# Patient Record
Sex: MALE | Race: WHITE | NOT HISPANIC OR LATINO | URBAN - METROPOLITAN AREA
[De-identification: names, ages, dates, MRNs, and addresses within clinical notes are randomized per-mention and may not be internally consistent; named-entity substitution may affect disease eponyms.]

---

## 2020-04-03 ENCOUNTER — IMPORTED ENCOUNTER (OUTPATIENT)
Dept: URBAN - METROPOLITAN AREA CLINIC 1 | Facility: CLINIC | Age: 50
End: 2020-04-03

## 2020-04-03 PROBLEM — H52.4: Noted: 2020-04-03

## 2020-04-03 PROBLEM — H52.01: Noted: 2020-04-03

## 2020-04-03 PROCEDURE — S0620 ROUTINE OPHTHALMOLOGICAL EXA: HCPCS

## 2020-04-03 NOTE — PATIENT DISCUSSION
1.  Hyperopia w/ Presbyopia -- Rx was given for corrective spectacles if indicated. CTL trial OD dispensed. Pt may have to use reading glasses over CTL. 2.  Amblyopia OD3. Cataract OU -- Observe. 4. H/o LASIK OD [facility] ~ 2010)Return in 2 week cc with Dr. Elma Senior. Return for an appointment in 2-3 months LASIK consult with Dr. Pauline Mcnulty.

## 2020-04-17 ENCOUNTER — IMPORTED ENCOUNTER (OUTPATIENT)
Dept: URBAN - METROPOLITAN AREA CLINIC 1 | Facility: CLINIC | Age: 50
End: 2020-04-17

## 2020-04-17 NOTE — PATIENT DISCUSSION
CC today -- Patient unhappy with distance vision OD. NI with over refraction. Explained that the contact lens OD is to help balance the vision in both eyes. Explained that glasses may not be the best option d/t the large difference in rx between both eyes. Explained that his astigmatism is too small to be corrected in contact lenses. Discussed with patient best corrected vision will be achieved with glasses. Patient understands may need to wear OTC readers to read fine print. Finalized CTLrx. Return for an appointment in 2-3 month LASIK Consult with Dr. Heather Ndiaye.

## 2020-08-28 ENCOUNTER — IMPORTED ENCOUNTER (OUTPATIENT)
Dept: URBAN - METROPOLITAN AREA CLINIC 1 | Facility: CLINIC | Age: 50
End: 2020-08-28

## 2020-08-28 PROBLEM — H52.01: Noted: 2020-08-28

## 2020-08-28 NOTE — PATIENT DISCUSSION
1.  Hyperopia: Refractive surgery not recommended given prior Lasik hx cataracts and degree of residual hyperopia. Will check cataract status in 1 year. 2. Return for an appointment for 1 year for a 30/glare with Dr. Jazmin Clark.

## 2022-03-04 ENCOUNTER — COMPREHENSIVE EXAM (OUTPATIENT)
Dept: URBAN - METROPOLITAN AREA CLINIC 1 | Facility: CLINIC | Age: 52
End: 2022-03-04

## 2022-03-04 DIAGNOSIS — H52.4: ICD-10-CM

## 2022-03-04 DIAGNOSIS — Z01.00: ICD-10-CM

## 2022-03-04 PROCEDURE — 92310 CONTACT LENS FITTING OU: CPT

## 2022-03-04 PROCEDURE — S0621 ROUTINE OPHTHALMOLOGICAL EXA: HCPCS

## 2022-03-04 ASSESSMENT — VISUAL ACUITY
OD_SC: J16
OD_SC: 20/50+1
OS_SC: 20/20-1
OS_SC: J3

## 2022-03-04 ASSESSMENT — TONOMETRY
OS_IOP_MMHG: 16
OD_IOP_MMHG: 16

## 2022-03-18 PROBLEM — Z12.11 COLON CANCER SCREENING: Status: ACTIVE | Noted: 2020-04-06

## 2022-03-18 PROBLEM — R05.9 COUGH: Status: ACTIVE | Noted: 2020-04-06

## 2022-03-18 PROBLEM — I10 HTN (HYPERTENSION): Status: ACTIVE | Noted: 2020-04-06

## 2022-03-18 PROBLEM — R03.0 ELEVATED BLOOD-PRESSURE READING, WITHOUT DIAGNOSIS OF HYPERTENSION: Status: ACTIVE | Noted: 2020-04-06

## 2022-03-18 PROBLEM — F43.20 ADJUSTMENT REACTION: Status: ACTIVE | Noted: 2020-04-06

## 2022-03-18 PROBLEM — D22.9 ATYPICAL MOLE: Status: ACTIVE | Noted: 2020-04-06

## 2022-03-18 PROBLEM — R39.14 FEELING OF INCOMPLETE BLADDER EMPTYING: Status: ACTIVE | Noted: 2020-04-06

## 2022-03-18 PROBLEM — D23.4 OTHER BENIGN NEOPLASM OF SKIN OF SCALP AND NECK: Status: ACTIVE | Noted: 2020-04-06

## 2022-03-18 PROBLEM — S80.01XA CONTUSION OF RIGHT KNEE: Status: ACTIVE | Noted: 2020-04-06

## 2022-03-19 PROBLEM — R63.5 ABNORMAL WEIGHT GAIN: Status: ACTIVE | Noted: 2020-04-06

## 2022-03-19 PROBLEM — E34.9 TESTOSTERONE DEFICIENCY: Status: ACTIVE | Noted: 2020-04-06

## 2022-03-19 PROBLEM — M25.561 PAIN IN RIGHT KNEE: Status: ACTIVE | Noted: 2020-04-06

## 2022-03-19 PROBLEM — Z86.39 HISTORY OF MORBID OBESITY: Status: ACTIVE | Noted: 2018-03-12

## 2022-03-19 PROBLEM — M10.9 GOUT: Status: ACTIVE | Noted: 2020-04-06

## 2022-03-19 PROBLEM — R39.14 BENIGN PROSTATIC HYPERPLASIA WITH INCOMPLETE BLADDER EMPTYING: Status: ACTIVE | Noted: 2020-04-06

## 2022-03-19 PROBLEM — F18.10: Status: ACTIVE | Noted: 2020-04-06

## 2022-03-19 PROBLEM — J20.0 ACUTE BRONCHITIS DUE TO MYCOPLASMA PNEUMONIAE: Status: ACTIVE | Noted: 2020-04-06

## 2022-03-19 PROBLEM — G47.30 SLEEP APNEA: Status: ACTIVE | Noted: 2019-02-27

## 2022-03-19 PROBLEM — F43.9 STRESS AT HOME: Status: ACTIVE | Noted: 2020-04-06

## 2022-03-19 PROBLEM — N40.1 BENIGN PROSTATIC HYPERPLASIA WITH INCOMPLETE BLADDER EMPTYING: Status: ACTIVE | Noted: 2020-04-06

## 2022-03-19 PROBLEM — E66.3 OVERWEIGHT: Status: ACTIVE | Noted: 2020-04-06

## 2022-03-19 PROBLEM — E66.01 SEVERE OBESITY (BMI 35.0-39.9) WITH COMORBIDITY (HCC): Status: ACTIVE | Noted: 2018-05-11
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
## 2022-03-20 PROBLEM — C43.60: Status: ACTIVE | Noted: 2020-04-06

## 2022-03-20 PROBLEM — F90.0 ATTENTION-DEFICIT HYPERACTIVITY DISORDER, PREDOMINANTLY INATTENTIVE TYPE: Status: ACTIVE | Noted: 2020-04-06

## 2022-04-02 ASSESSMENT — TONOMETRY
OD_IOP_MMHG: 16
OS_IOP_MMHG: 16

## 2022-04-02 ASSESSMENT — VISUAL ACUITY
OD_CC: 20/40
OS_CC: 20/20-1
OD_SC: 20/20-1
OS_CC: 20/20
OD_SC: 20/20-2
OS_CC: 20/20

## 2023-11-29 ENCOUNTER — ANESTHESIA EVENT (OUTPATIENT)
Facility: HOSPITAL | Age: 53
End: 2023-11-29
Payer: COMMERCIAL

## 2023-11-30 ENCOUNTER — HOSPITAL ENCOUNTER (OUTPATIENT)
Facility: HOSPITAL | Age: 53
Setting detail: OUTPATIENT SURGERY
Discharge: HOME | End: 2023-11-30
Attending: UROLOGY | Admitting: UROLOGY
Payer: COMMERCIAL

## 2023-11-30 ENCOUNTER — ANESTHESIA (OUTPATIENT)
Facility: HOSPITAL | Age: 53
End: 2023-11-30
Payer: COMMERCIAL

## 2023-11-30 VITALS
DIASTOLIC BLOOD PRESSURE: 76 MMHG | HEIGHT: 71 IN | HEART RATE: 87 BPM | BODY MASS INDEX: 38.69 KG/M2 | RESPIRATION RATE: 16 BRPM | TEMPERATURE: 97.6 F | OXYGEN SATURATION: 95 % | WEIGHT: 276.4 LBS | SYSTOLIC BLOOD PRESSURE: 123 MMHG

## 2023-11-30 DIAGNOSIS — Z01.818 PRE-OP TESTING: Primary | ICD-10-CM

## 2023-11-30 PROCEDURE — 2500000003 HC RX 250 WO HCPCS

## 2023-11-30 PROCEDURE — 7100000010 HC PHASE II RECOVERY - FIRST 15 MIN: Performed by: UROLOGY

## 2023-11-30 PROCEDURE — 3600000012 HC SURGERY LEVEL 2 ADDTL 15MIN: Performed by: UROLOGY

## 2023-11-30 PROCEDURE — 7100000011 HC PHASE II RECOVERY - ADDTL 15 MIN: Performed by: UROLOGY

## 2023-11-30 PROCEDURE — 7100000000 HC PACU RECOVERY - FIRST 15 MIN: Performed by: UROLOGY

## 2023-11-30 PROCEDURE — 2580000003 HC RX 258: Performed by: UROLOGY

## 2023-11-30 PROCEDURE — 2720000010 HC SURG SUPPLY STERILE: Performed by: UROLOGY

## 2023-11-30 PROCEDURE — 2709999900 HC NON-CHARGEABLE SUPPLY: Performed by: UROLOGY

## 2023-11-30 PROCEDURE — 3700000001 HC ADD 15 MINUTES (ANESTHESIA): Performed by: UROLOGY

## 2023-11-30 PROCEDURE — 3700000000 HC ANESTHESIA ATTENDED CARE: Performed by: UROLOGY

## 2023-11-30 PROCEDURE — 3600000002 HC SURGERY LEVEL 2 BASE: Performed by: UROLOGY

## 2023-11-30 PROCEDURE — 6360000002 HC RX W HCPCS

## 2023-11-30 PROCEDURE — 7100000001 HC PACU RECOVERY - ADDTL 15 MIN: Performed by: UROLOGY

## 2023-11-30 PROCEDURE — 6360000002 HC RX W HCPCS: Performed by: UROLOGY

## 2023-11-30 RX ORDER — LEVOFLOXACIN 5 MG/ML
500 INJECTION, SOLUTION INTRAVENOUS
Status: COMPLETED | OUTPATIENT
Start: 2023-11-30 | End: 2023-11-30

## 2023-11-30 RX ORDER — SODIUM CHLORIDE 9 MG/ML
INJECTION, SOLUTION INTRAVENOUS PRN
Status: DISCONTINUED | OUTPATIENT
Start: 2023-11-30 | End: 2023-11-30 | Stop reason: HOSPADM

## 2023-11-30 RX ORDER — ONDANSETRON 2 MG/ML
4 INJECTION INTRAMUSCULAR; INTRAVENOUS
Status: DISCONTINUED | OUTPATIENT
Start: 2023-11-30 | End: 2023-11-30 | Stop reason: HOSPADM

## 2023-11-30 RX ORDER — DROPERIDOL 2.5 MG/ML
0.62 INJECTION, SOLUTION INTRAMUSCULAR; INTRAVENOUS
Status: DISCONTINUED | OUTPATIENT
Start: 2023-11-30 | End: 2023-11-30 | Stop reason: HOSPADM

## 2023-11-30 RX ORDER — LIDOCAINE HYDROCHLORIDE 20 MG/ML
INJECTION, SOLUTION EPIDURAL; INFILTRATION; INTRACAUDAL; PERINEURAL PRN
Status: DISCONTINUED | OUTPATIENT
Start: 2023-11-30 | End: 2023-11-30 | Stop reason: SDUPTHER

## 2023-11-30 RX ORDER — SODIUM CHLORIDE 0.9 % (FLUSH) 0.9 %
5-40 SYRINGE (ML) INJECTION EVERY 12 HOURS SCHEDULED
Status: DISCONTINUED | OUTPATIENT
Start: 2023-11-30 | End: 2023-11-30 | Stop reason: HOSPADM

## 2023-11-30 RX ORDER — IPRATROPIUM BROMIDE AND ALBUTEROL SULFATE 2.5; .5 MG/3ML; MG/3ML
1 SOLUTION RESPIRATORY (INHALATION)
Status: DISCONTINUED | OUTPATIENT
Start: 2023-11-30 | End: 2023-11-30 | Stop reason: HOSPADM

## 2023-11-30 RX ORDER — SODIUM CHLORIDE, SODIUM LACTATE, POTASSIUM CHLORIDE, CALCIUM CHLORIDE 600; 310; 30; 20 MG/100ML; MG/100ML; MG/100ML; MG/100ML
INJECTION, SOLUTION INTRAVENOUS CONTINUOUS
Status: DISCONTINUED | OUTPATIENT
Start: 2023-11-30 | End: 2023-11-30 | Stop reason: HOSPADM

## 2023-11-30 RX ORDER — FENTANYL CITRATE 50 UG/ML
INJECTION, SOLUTION INTRAMUSCULAR; INTRAVENOUS PRN
Status: DISCONTINUED | OUTPATIENT
Start: 2023-11-30 | End: 2023-11-30 | Stop reason: SDUPTHER

## 2023-11-30 RX ORDER — FUROSEMIDE 10 MG/ML
INJECTION INTRAMUSCULAR; INTRAVENOUS PRN
Status: DISCONTINUED | OUTPATIENT
Start: 2023-11-30 | End: 2023-11-30 | Stop reason: SDUPTHER

## 2023-11-30 RX ORDER — MIDAZOLAM HYDROCHLORIDE 1 MG/ML
INJECTION INTRAMUSCULAR; INTRAVENOUS PRN
Status: DISCONTINUED | OUTPATIENT
Start: 2023-11-30 | End: 2023-11-30 | Stop reason: SDUPTHER

## 2023-11-30 RX ORDER — OXYCODONE HYDROCHLORIDE 5 MG/1
5 TABLET ORAL
Status: DISCONTINUED | OUTPATIENT
Start: 2023-11-30 | End: 2023-11-30 | Stop reason: HOSPADM

## 2023-11-30 RX ORDER — MEPERIDINE HYDROCHLORIDE 50 MG/ML
12.5 INJECTION INTRAMUSCULAR; INTRAVENOUS; SUBCUTANEOUS ONCE
Status: DISCONTINUED | OUTPATIENT
Start: 2023-11-30 | End: 2023-11-30 | Stop reason: HOSPADM

## 2023-11-30 RX ORDER — FENTANYL CITRATE 50 UG/ML
25 INJECTION, SOLUTION INTRAMUSCULAR; INTRAVENOUS EVERY 5 MIN PRN
Status: DISCONTINUED | OUTPATIENT
Start: 2023-11-30 | End: 2023-11-30 | Stop reason: HOSPADM

## 2023-11-30 RX ORDER — PROPOFOL 10 MG/ML
INJECTION, EMULSION INTRAVENOUS PRN
Status: DISCONTINUED | OUTPATIENT
Start: 2023-11-30 | End: 2023-11-30 | Stop reason: SDUPTHER

## 2023-11-30 RX ORDER — ONDANSETRON 2 MG/ML
INJECTION INTRAMUSCULAR; INTRAVENOUS PRN
Status: DISCONTINUED | OUTPATIENT
Start: 2023-11-30 | End: 2023-11-30 | Stop reason: SDUPTHER

## 2023-11-30 RX ORDER — LABETALOL HYDROCHLORIDE 5 MG/ML
10 INJECTION, SOLUTION INTRAVENOUS
Status: DISCONTINUED | OUTPATIENT
Start: 2023-11-30 | End: 2023-11-30 | Stop reason: HOSPADM

## 2023-11-30 RX ORDER — DEXAMETHASONE SODIUM PHOSPHATE 4 MG/ML
INJECTION, SOLUTION INTRA-ARTICULAR; INTRALESIONAL; INTRAMUSCULAR; INTRAVENOUS; SOFT TISSUE PRN
Status: DISCONTINUED | OUTPATIENT
Start: 2023-11-30 | End: 2023-11-30 | Stop reason: SDUPTHER

## 2023-11-30 RX ORDER — DIPHENHYDRAMINE HYDROCHLORIDE 50 MG/ML
12.5 INJECTION INTRAMUSCULAR; INTRAVENOUS
Status: DISCONTINUED | OUTPATIENT
Start: 2023-11-30 | End: 2023-11-30 | Stop reason: HOSPADM

## 2023-11-30 RX ORDER — SODIUM CHLORIDE 0.9 % (FLUSH) 0.9 %
5-40 SYRINGE (ML) INJECTION PRN
Status: DISCONTINUED | OUTPATIENT
Start: 2023-11-30 | End: 2023-11-30 | Stop reason: HOSPADM

## 2023-11-30 RX ORDER — HYDROMORPHONE HYDROCHLORIDE 1 MG/ML
0.5 INJECTION, SOLUTION INTRAMUSCULAR; INTRAVENOUS; SUBCUTANEOUS EVERY 5 MIN PRN
Status: DISCONTINUED | OUTPATIENT
Start: 2023-11-30 | End: 2023-11-30 | Stop reason: HOSPADM

## 2023-11-30 RX ADMIN — FENTANYL CITRATE 50 MCG: 50 INJECTION, SOLUTION INTRAMUSCULAR; INTRAVENOUS at 15:54

## 2023-11-30 RX ADMIN — ONDANSETRON HYDROCHLORIDE 4 MG: 2 INJECTION INTRAMUSCULAR; INTRAVENOUS at 16:04

## 2023-11-30 RX ADMIN — SODIUM CHLORIDE, POTASSIUM CHLORIDE, SODIUM LACTATE AND CALCIUM CHLORIDE: 600; 310; 30; 20 INJECTION, SOLUTION INTRAVENOUS at 13:33

## 2023-11-30 RX ADMIN — FENTANYL CITRATE 25 MCG: 50 INJECTION, SOLUTION INTRAMUSCULAR; INTRAVENOUS at 15:58

## 2023-11-30 RX ADMIN — DEXAMETHASONE SODIUM PHOSPHATE 4 MG: 4 INJECTION, SOLUTION INTRAMUSCULAR; INTRAVENOUS at 16:04

## 2023-11-30 RX ADMIN — LIDOCAINE HYDROCHLORIDE 100 MG: 20 INJECTION, SOLUTION EPIDURAL; INFILTRATION; INTRACAUDAL; PERINEURAL at 15:59

## 2023-11-30 RX ADMIN — LEVOFLOXACIN 500 MG: 5 INJECTION, SOLUTION INTRAVENOUS at 16:04

## 2023-11-30 RX ADMIN — FENTANYL CITRATE 25 MCG: 50 INJECTION, SOLUTION INTRAMUSCULAR; INTRAVENOUS at 16:14

## 2023-11-30 RX ADMIN — MIDAZOLAM 2 MG: 1 INJECTION INTRAMUSCULAR; INTRAVENOUS at 15:54

## 2023-11-30 RX ADMIN — FUROSEMIDE 10 MG: 10 INJECTION, SOLUTION INTRAMUSCULAR; INTRAVENOUS at 16:09

## 2023-11-30 RX ADMIN — PROPOFOL 200 MG: 10 INJECTION, EMULSION INTRAVENOUS at 15:59

## 2023-11-30 ASSESSMENT — PAIN - FUNCTIONAL ASSESSMENT: PAIN_FUNCTIONAL_ASSESSMENT: 0-10

## 2023-11-30 NOTE — OP NOTE
Operative Note      Patient: Rebecca Hussein  YOB: 1970  MRN: [id number]    Date of Procedure: 11/30/2023    Pre-Op Diagnosis Codes:     * Enlarged prostate with urinary obstruction [N40.1, N13.8]    Post-Op Diagnosis: Same       Procedure(s):  REZUM WATER VAPOR THERAPY OF PROSTATE \"SPEC POP\"    Surgeon(s):  Madhuri Angelo MD    Assistant:   * No surgical staff found *    Anesthesia: Other    Estimated Blood Loss (mL): Minimal    Complications: None    Specimens:   * No specimens in log *    Implants:  * No implants in log *      Drains: * No LDAs found *    Findings: Enlarged prostate        Detailed Description of Procedure:     DESCRIPTION OF PROCEDURE:  The pt was brought into the operating room, after the administration of general anesthesia, the patient was placed in lithotomy position. Transurethral destruction of prostate tissue; by radiofrequency thermotherapy. The prostatic urethra length was measured  The anterior urethra, prostate, bladder neck, right and left orifices were visualized as the scope was passed. RF was then used to create thermal energy to selectively ablate prostate tissue 1cm from the bladder neck to the proximal end of the veru spaced 1cm apart. A total of 4 treatments were performed, 2 on each side. At the completion of the procedure the treatment device was removed. There was a careful check that there were no clots in the bladder or injury to the bladder, prostate or urethra. 10 mg of Lasix was given IV to promote diuresis.   A 22 [place] arnold was placed with clear efflux urine Pt was extubated and transferred to PACU in stable condition     Electronically signed by Melodie Romero MD on 11/30/2023 at 3:43 PM

## 2023-11-30 NOTE — PERIOP NOTE
TRANSFER - IN REPORT:    Verbal report received from Nirmala Erazo and Ruben Agustin. CRNA on Walker Ramos  being received from OR for routine post-op      Report consisted of patient's Situation, Background, Assessment and   Recommendations(SBAR). Information from the following report(s) Nurse Handoff Report, Surgery Report, Intake/Output, and MAR was reviewed with the receiving nurse. Opportunity for questions and clarification was provided. Assessment completed upon patient's arrival to unit and care assumed.

## 2023-11-30 NOTE — DISCHARGE INSTRUCTIONS
with the patient and spouse. The patient and spouse verbalized understanding. Discharge medications reviewed with the patient and spouse and appropriate educational materials and side effects teaching were provided.   ___________________________________________________________________________________________________________________________________

## 2023-11-30 NOTE — H&P
Urology [address] [facility],[id number]  Tel:  [phone]  Fax: [phone]    Patient : Andrei Owusu   YOB: 1970   Birth Sex: Male      Current Gender: Male      Date:  11/20/2023 12:26 PM    Visit Type:   Preop   Assessment/Plan  # Detail Type Description    1. Assessment Enlarged prostate with lower urinary tract symptoms (N40.1). Patient Plan Pt with worsening urinary problems. On US his prostate was under 75gm, Cystoscopy revealed enlarged lateral lobes but not median lobe. His uroflow study revealed evidence of obstruction. I review options with him and he wants to proceed with Rezum. All risks including pain infection bleeding possible need for additonal procedure in the future were reviewed. He understands and wants to proceed. Plan Orders BASIC METABOLIC PANEL to be performed and CBC w DIFF to be performed. 2. Assessment Frequency of micturition (R35.0). 3. Assessment Nocturia (R35.1). 4. Assessment Urgency of urination (R39.15). Patient Plan               Additional Visit Information    This 52year old patient presents for BPH and erectile dysfunction uro. History of Present Illness  1. BPH   Onset was gradual. It occurs daily. The problem is worse. Associated symptoms include nocturia and urinary frequency. Pertinent negatives include chills, constipation, fever, slow stream and urinary incontinence. Additional information: Pt w nocturia x 2. He tried tamsulosin which resulted in retrograde ejaculation and some lightheadedness and dizziness. Sxs are due to OAB or BPH. Comments: Patient with urinary difficulties on exam however his prostate seems rather small. Symptoms may be due to bladder spasms. Samples of gem Marisol 20 mg given. Review symptoms 6 weeks  07/19/2023  Patient here for follow-up. He has not seen any improvement with gem Marisol.   Issues may be due to anatomy of the prostate

## 2023-11-30 NOTE — PERIOP NOTE
Discharge instructions explained to caregiver, no questions or concerns at this time. Instructions given to patient to take home with him.

## 2023-11-30 NOTE — ANESTHESIA PRE PROCEDURE
Department of Anesthesiology  Preprocedure Note       Name:  Luis Lackey   Age:  52 y.o.  :  1970                                          MRN:  [id number]         Date:  2023      Surgeon: Darren Null):  Michelle Turcios MD    Procedure: Procedure(s):  REZUM WATER VAPOR THERAPY OF PROSTATE \"SPEC POP\"    Medications prior to admission:   Prior to Admission medications    Medication Sig Start Date End Date Taking? Authorizing Provider   amphetamine-dextroamphetamine (ADDERALL XR) 20 MG extended release capsule Take 1 capsule by mouth every morning. 23   Yevgeniy Doshi MD   hydroCHLOROthiazide (HYDRODIURIL) 25 MG tablet Take 1 tablet by mouth daily 10/17/23   Yevgeniy Doshi MD   losartan (COZAAR) 100 MG tablet Take 1 tablet by mouth daily 10/18/23   Yevgeniy Doshi MD   simvastatin (ZOCOR) 40 MG tablet Take 1 tablet by mouth nightly 10/18/23   Yevgeniy Doshi MD   [address] [place] [address], 2 MG/DOSE, 8 MG/3ML SOPN INJECT 2MG SUBCUTANEOUSLY EVERY WEEK ON THE SAME DAY OF [address] [facility]  Patient not taking: Reported on 2023   Yevgeniy Doshi MD   traZODone (DESYREL) 50 MG tablet TAKE 0.5-1 TABLET BY ORAL ROUTE AT BEDTIME  Patient not taking: Reported on 2023   Yevgeniy Doshi MD   triamcinolone (KENALOG) 0.1 % cream Apply topically 2 times daily 23   Yevgeniy Doshi MD   omeprazole (PRILOSEC) 20 MG delayed release capsule Take 1 capsule by mouth Daily    Yevgeniy Doshi MD   Methylphenidate 54 MG CR tablet Take 54 mg by mouth. Automatic Reconciliation, Ar   sildenafil (REVATIO) 20 MG tablet Take 1-5 tabs by mouth one hour prior to sexual activity 3/8/19   Automatic Reconciliation, Ar   tadalafil (CIALIS) 5 MG tablet Take 1 tablet by mouth daily 20   Automatic Reconciliation, Ar   testosterone cypionate (DEPOTESTOTERONE CYPIONATE) 200 MG/ML injection Inject 1 mL into the muscle once.  Once a week on Friday  0.5 ml 21   Automatic

## 2023-11-30 NOTE — PERIOP NOTE
Reviewed PTA medication list with patient/caregiver and patient/caregiver denies any additional medications. Patient admits to having a responsible adult care for them at home for at least 24 hours after surgery. Patient encouraged to use gown warming system and informed that using said warming gown to regulate body temperature prior to a procedure has been shown to help reduce the risks of blood clots and infection. Patient's pharmacy of choice verified and documented in PTA medication section. Dual skin assessment & fall risk band verification completed with Junie MAXWELL.

## 2023-11-30 NOTE — INTERVAL H&P NOTE
Update History & Physical    The patient's History and Physical of November 20, 2023 was reviewed with the patient and I examined the patient. There was no change. The surgical site was confirmed by the patient and me. Plan: The risks, benefits, expected outcome, and alternative to the recommended procedure have been discussed with the patient. Patient understands and wants to proceed with the procedure.      Electronically signed by Bambi Calhoun MD on 11/30/2023 at 2:04 PM

## (undated) DEVICE — DEVICE DEL H2O VPR THER W/CABLE SYR SPIKE ADAPTOR VI REZUM

## (undated) DEVICE — STRAP,POSITIONING,KNEE/BODY,FOAM,4X60": Brand: MEDLINE

## (undated) DEVICE — SOLUTION IV 1000ML 0.9% SOD CHL PH 5 INJ USP VIAFLX PLAS

## (undated) DEVICE — CYSTO PACK: Brand: MEDLINE INDUSTRIES, INC.

## (undated) DEVICE — POUCH DRNGE FLX BND INTEGR RAIL CLMP DISP EZ CTCH

## (undated) DEVICE — STERILE LATEX POWDER-FREE SURGICAL GLOVESWITH NITRILE COATING: Brand: PROTEXIS

## (undated) DEVICE — CATHETER URETH 16FR BLLN 5CC 2 W F SPEC M OLV COUDE TIP